# Patient Record
Sex: MALE | Race: WHITE | NOT HISPANIC OR LATINO | Employment: FULL TIME | URBAN - METROPOLITAN AREA
[De-identification: names, ages, dates, MRNs, and addresses within clinical notes are randomized per-mention and may not be internally consistent; named-entity substitution may affect disease eponyms.]

---

## 2017-12-11 ENCOUNTER — HOSPITAL ENCOUNTER (EMERGENCY)
Facility: HOSPITAL | Age: 47
Discharge: HOME | End: 2017-12-11
Attending: EMERGENCY MEDICINE | Admitting: EMERGENCY MEDICINE

## 2017-12-11 ENCOUNTER — APPOINTMENT (OUTPATIENT)
Dept: GENERAL RADIOLOGY | Facility: HOSPITAL | Age: 47
End: 2017-12-11

## 2017-12-11 VITALS
SYSTOLIC BLOOD PRESSURE: 133 MMHG | TEMPERATURE: 97.7 F | HEART RATE: 93 BPM | OXYGEN SATURATION: 97 % | DIASTOLIC BLOOD PRESSURE: 93 MMHG | HEIGHT: 75 IN | BODY MASS INDEX: 29.73 KG/M2 | WEIGHT: 239.13 LBS | RESPIRATION RATE: 22 BRPM

## 2017-12-11 DIAGNOSIS — J45.901 SEVERE ASTHMA WITH EXACERBATION, UNSPECIFIED WHETHER PERSISTENT: Primary | ICD-10-CM

## 2017-12-11 LAB
ALBUMIN SERPL-MCNC: 4.5 G/DL (ref 3.5–5.2)
ALBUMIN/GLOB SERPL: 1.5 G/DL
ALP SERPL-CCNC: 67 U/L (ref 40–129)
ALT SERPL W P-5'-P-CCNC: 52 U/L (ref 5–41)
ANION GAP SERPL CALCULATED.3IONS-SCNC: 16.5 MMOL/L
ARTERIAL PATENCY WRIST A: POSITIVE
AST SERPL-CCNC: 40 U/L (ref 5–40)
ATMOSPHERIC PRESS: 733 MMHG
BASE EXCESS BLDA CALC-SCNC: -2.5 MMOL/L (ref 0–2)
BASOPHILS # BLD AUTO: 0.06 10*3/MM3 (ref 0–0.2)
BASOPHILS NFR BLD AUTO: 0.7 % (ref 0–2)
BDY SITE: ABNORMAL
BILIRUB SERPL-MCNC: 0.3 MG/DL (ref 0.2–1.2)
BODY TEMPERATURE: 37 C
BUN BLD-MCNC: 9 MG/DL (ref 6–20)
BUN/CREAT SERPL: 9.8 (ref 7–25)
CALCIUM SPEC-SCNC: 9.2 MG/DL (ref 8.6–10.5)
CHLORIDE SERPL-SCNC: 102 MMOL/L (ref 98–107)
CO2 SERPL-SCNC: 22.5 MMOL/L (ref 22–29)
CREAT BLD-MCNC: 0.92 MG/DL (ref 0.76–1.27)
DEPRECATED RDW RBC AUTO: 43.9 FL (ref 37–54)
EOSINOPHIL # BLD AUTO: 0.45 10*3/MM3 (ref 0.1–0.3)
EOSINOPHIL NFR BLD AUTO: 5.1 % (ref 0–4)
ERYTHROCYTE [DISTWIDTH] IN BLOOD BY AUTOMATED COUNT: 13.7 % (ref 11.5–14.5)
GAS FLOW AIRWAY: 2 LPM
GFR SERPL CREATININE-BSD FRML MDRD: 89 ML/MIN/1.73
GLOBULIN UR ELPH-MCNC: 3.1 GM/DL
GLUCOSE BLD-MCNC: 98 MG/DL (ref 65–99)
HCO3 BLDA-SCNC: 21.7 MMOL/L (ref 20–26)
HCT VFR BLD AUTO: 41.2 % (ref 42–52)
HGB BLD-MCNC: 14.4 G/DL (ref 14–18)
HGB BLDA-MCNC: 14.2 G/DL (ref 14–18)
IMM GRANULOCYTES # BLD: 0.06 10*3/MM3 (ref 0–0.03)
IMM GRANULOCYTES NFR BLD: 0.7 % (ref 0–0.5)
LYMPHOCYTES # BLD AUTO: 3.8 10*3/MM3 (ref 0.6–4.8)
LYMPHOCYTES NFR BLD AUTO: 43.5 % (ref 20–45)
Lab: ABNORMAL
MCH RBC QN AUTO: 31 PG (ref 27–31)
MCHC RBC AUTO-ENTMCNC: 35 G/DL (ref 31–37)
MCV RBC AUTO: 88.8 FL (ref 80–94)
MODALITY: ABNORMAL
MONOCYTES # BLD AUTO: 0.94 10*3/MM3 (ref 0–1)
MONOCYTES NFR BLD AUTO: 10.8 % (ref 3–8)
NEUTROPHILS # BLD AUTO: 3.43 10*3/MM3 (ref 1.5–8.3)
NEUTROPHILS NFR BLD AUTO: 39.2 % (ref 45–70)
NRBC BLD MANUAL-RTO: 0 /100 WBC (ref 0–0)
NT-PROBNP SERPL-MCNC: 20.5 PG/ML (ref 0–450)
PCO2 BLDA: 35.1 MM HG (ref 35–45)
PCO2 TEMP ADJ BLD: 35.1 MM HG (ref 35–45)
PH BLDA: 7.4 PH UNITS (ref 7.35–7.45)
PH, TEMP CORRECTED: 7.4 PH UNITS (ref 7.35–7.45)
PLATELET # BLD AUTO: 239 10*3/MM3 (ref 140–500)
PMV BLD AUTO: 8.7 FL (ref 7.4–10.4)
PO2 BLDA: 103 MM HG (ref 83–108)
PO2 TEMP ADJ BLD: 103 MM HG (ref 83–108)
POTASSIUM BLD-SCNC: 3.7 MMOL/L (ref 3.5–5.2)
PROT SERPL-MCNC: 7.6 G/DL (ref 6–8.5)
RBC # BLD AUTO: 4.64 10*6/MM3 (ref 4.7–6.1)
SAO2 % BLDCOA: 98.7 % (ref 94–99)
SODIUM BLD-SCNC: 141 MMOL/L (ref 136–145)
TROPONIN T SERPL-MCNC: <0.01 NG/ML (ref 0–0.03)
VENTILATOR MODE: ABNORMAL
WBC NRBC COR # BLD: 8.74 10*3/MM3 (ref 4.8–10.8)

## 2017-12-11 PROCEDURE — 85025 COMPLETE CBC W/AUTO DIFF WBC: CPT | Performed by: EMERGENCY MEDICINE

## 2017-12-11 PROCEDURE — 99284 EMERGENCY DEPT VISIT MOD MDM: CPT

## 2017-12-11 PROCEDURE — 93010 ELECTROCARDIOGRAM REPORT: CPT | Performed by: INTERNAL MEDICINE

## 2017-12-11 PROCEDURE — 36600 WITHDRAWAL OF ARTERIAL BLOOD: CPT

## 2017-12-11 PROCEDURE — 80053 COMPREHEN METABOLIC PANEL: CPT | Performed by: EMERGENCY MEDICINE

## 2017-12-11 PROCEDURE — 71020 HC CHEST PA AND LATERAL: CPT

## 2017-12-11 PROCEDURE — 94799 UNLISTED PULMONARY SVC/PX: CPT

## 2017-12-11 PROCEDURE — 25010000002 METHYLPREDNISOLONE PER 125 MG: Performed by: EMERGENCY MEDICINE

## 2017-12-11 PROCEDURE — 94640 AIRWAY INHALATION TREATMENT: CPT

## 2017-12-11 PROCEDURE — 96374 THER/PROPH/DIAG INJ IV PUSH: CPT

## 2017-12-11 PROCEDURE — 99285 EMERGENCY DEPT VISIT HI MDM: CPT | Performed by: EMERGENCY MEDICINE

## 2017-12-11 PROCEDURE — 82803 BLOOD GASES ANY COMBINATION: CPT

## 2017-12-11 PROCEDURE — 93005 ELECTROCARDIOGRAM TRACING: CPT | Performed by: EMERGENCY MEDICINE

## 2017-12-11 PROCEDURE — 84484 ASSAY OF TROPONIN QUANT: CPT | Performed by: EMERGENCY MEDICINE

## 2017-12-11 PROCEDURE — 83880 ASSAY OF NATRIURETIC PEPTIDE: CPT | Performed by: EMERGENCY MEDICINE

## 2017-12-11 RX ORDER — METHYLPREDNISOLONE SODIUM SUCCINATE 125 MG/2ML
125 INJECTION, POWDER, LYOPHILIZED, FOR SOLUTION INTRAMUSCULAR; INTRAVENOUS ONCE
Status: COMPLETED | OUTPATIENT
Start: 2017-12-11 | End: 2017-12-11

## 2017-12-11 RX ORDER — PREDNISONE 20 MG/1
60 TABLET ORAL DAILY
Qty: 15 TABLET | Refills: 0 | Status: SHIPPED | OUTPATIENT
Start: 2017-12-11 | End: 2017-12-16

## 2017-12-11 RX ORDER — IPRATROPIUM BROMIDE AND ALBUTEROL SULFATE 2.5; .5 MG/3ML; MG/3ML
3 SOLUTION RESPIRATORY (INHALATION) ONCE
Status: COMPLETED | OUTPATIENT
Start: 2017-12-11 | End: 2017-12-11

## 2017-12-11 RX ORDER — AZITHROMYCIN 250 MG/1
TABLET, FILM COATED ORAL
Qty: 6 TABLET | Refills: 0 | Status: SHIPPED | OUTPATIENT
Start: 2017-12-11

## 2017-12-11 RX ORDER — ALBUTEROL SULFATE 2.5 MG/3ML
2.5 SOLUTION RESPIRATORY (INHALATION) ONCE
Status: COMPLETED | OUTPATIENT
Start: 2017-12-11 | End: 2017-12-11

## 2017-12-11 RX ADMIN — IPRATROPIUM BROMIDE AND ALBUTEROL SULFATE 3 ML: .5; 3 SOLUTION RESPIRATORY (INHALATION) at 20:26

## 2017-12-11 RX ADMIN — METHYLPREDNISOLONE SODIUM SUCCINATE 125 MG: 125 INJECTION, POWDER, FOR SOLUTION INTRAMUSCULAR; INTRAVENOUS at 20:30

## 2017-12-11 RX ADMIN — ALBUTEROL SULFATE 2.5 MG: 2.5 SOLUTION RESPIRATORY (INHALATION) at 20:32

## 2017-12-12 NOTE — ED PROVIDER NOTES
Subjective   History of Present Illness  History of Present Illness    Chief complaint: Dyspnea    Location: chest    Quality/Severity:  Moderate-to-severe    Timing/Duration: Began about one hour ago    Modifying Factors: None    Narrative: This patient presents for evaluation of new onset difficulties breathing.  He has a history of asthma.  He says that he felt fine through most of the day.  Then sometime this evening shortly after eating dinner he had a lot of shortness of breath with wheezing and coughing.  He tried his home nebulizer and it a breathing treatment but that did not seem to help very much.  Now he is feeling more anxious.  He is having a nonproductive cough.  He denies any fevers.  He has had some aching in his chest but he thinks it's from his breathing symptoms tonight.  He does not have any history of heart disease.  He does not wear oxygen at home.  He does not smoke.    Associated Symptoms: As above    Review of Systems   Constitutional: Negative for activity change, chills and fever.   Respiratory: Positive for cough, chest tightness, shortness of breath and wheezing.    Cardiovascular: Positive for chest pain (aching all across). Negative for palpitations and leg swelling.   Gastrointestinal: Negative for abdominal distention and vomiting.   Skin: Negative for color change and rash.   Neurological: Negative for seizures and syncope.   All other systems reviewed and are negative.      Past Medical History:   Diagnosis Date   • Asthma    • Depression    • Disease of thyroid gland     hypothyroidism       Allergies   Allergen Reactions   • No Known Drug Allergy    • Other        Past Surgical History:   Procedure Laterality Date   • KNEE SURGERY     • MOUTH SURGERY         Family History   Problem Relation Age of Onset   • Arthritis Mother    • Diabetes Mother    • Arthritis Father    • Heart disease Father    • Diabetes Father    • Emphysema Father    • Emphysema Paternal Grandfather         Social History     Social History   • Marital status:      Spouse name: N/A   • Number of children: N/A   • Years of education: N/A     Social History Main Topics   • Smoking status: Never Smoker   • Smokeless tobacco: None   • Alcohol use 1.2 oz/week     2 Cans of beer per week   • Drug use: None   • Sexual activity: Not Asked     Other Topics Concern   • None     Social History Narrative   • None       ED Triage Vitals   Temp Heart Rate Resp BP SpO2   12/11/17 2008 12/11/17 2005 12/11/17 2005 12/11/17 2006 12/11/17 2005   97.7 °F (36.5 °C) 108 24 158/103 94 %      Temp src Heart Rate Source Patient Position BP Location FiO2 (%)   -- -- -- -- --                Objective   Physical Exam   Constitutional: He is oriented to person, place, and time. He appears well-developed and well-nourished. He appears distressed (mild).   HENT:   Head: Normocephalic and atraumatic.   Eyes: EOM are normal. Pupils are equal, round, and reactive to light. Right eye exhibits no discharge. Left eye exhibits no discharge.   Neck: Normal range of motion. Neck supple. No tracheal deviation present.   Cardiovascular: Normal rate, regular rhythm, normal heart sounds and intact distal pulses.    Slightly tachycardic around 105 bpm   Pulmonary/Chest: He is in respiratory distress. He has wheezes. He has no rales. He exhibits no tenderness.   Tight sounding chest with diminished breath sounds bilaterally and some diffuse wheezes bilaterally as well.  There is mild to moderate accessory muscle use noted and some mild tachypnea with a respiratory rate around 24/ min   Abdominal: Soft. He exhibits no mass. There is no tenderness.   Musculoskeletal: Normal range of motion. He exhibits no edema or deformity.   Neurological: He is alert and oriented to person, place, and time. He exhibits normal muscle tone.   Skin: Skin is warm. No rash noted. He is diaphoretic (slightly along the forehead). No erythema. No pallor.   Psychiatric: He has  a normal mood and affect. His behavior is normal. Judgment and thought content normal.   Nursing note and vitals reviewed.      EKG           EKG time/Interp time: 2012/2012  Rhythm/Rate: Sinus tachycardia, 106 bpm  P waves and NJ: P waves are present, 123 ms  QRS, axis: 82 ms, normal axis   ST and T waves: No acute ischemic changes evident     Independently interpreted by me contemporaneously with treatment    Results for orders placed or performed during the hospital encounter of 12/11/17   Comprehensive Metabolic Panel   Result Value Ref Range    Glucose 98 65 - 99 mg/dL    BUN 9 6 - 20 mg/dL    Creatinine 0.92 0.76 - 1.27 mg/dL    Sodium 141 136 - 145 mmol/L    Potassium 3.7 3.5 - 5.2 mmol/L    Chloride 102 98 - 107 mmol/L    CO2 22.5 22.0 - 29.0 mmol/L    Calcium 9.2 8.6 - 10.5 mg/dL    Total Protein 7.6 6.0 - 8.5 g/dL    Albumin 4.50 3.50 - 5.20 g/dL    ALT (SGPT) 52 (H) 5 - 41 U/L    AST (SGOT) 40 5 - 40 U/L    Alkaline Phosphatase 67 40 - 129 U/L    Total Bilirubin 0.3 0.2 - 1.2 mg/dL    eGFR Non African Amer 89 >60 mL/min/1.73    Globulin 3.1 gm/dL    A/G Ratio 1.5 g/dL    BUN/Creatinine Ratio 9.8 7.0 - 25.0    Anion Gap 16.5 mmol/L   BNP   Result Value Ref Range    proBNP 20.5 0.0 - 450.0 pg/mL   Troponin   Result Value Ref Range    Troponin T <0.010 0.000 - 0.030 ng/mL   CBC Auto Differential   Result Value Ref Range    WBC 8.74 4.80 - 10.80 10*3/mm3    RBC 4.64 (L) 4.70 - 6.10 10*6/mm3    Hemoglobin 14.4 14.0 - 18.0 g/dL    Hematocrit 41.2 (L) 42.0 - 52.0 %    MCV 88.8 80.0 - 94.0 fL    MCH 31.0 27.0 - 31.0 pg    MCHC 35.0 31.0 - 37.0 g/dL    RDW 13.7 11.5 - 14.5 %    RDW-SD 43.9 37.0 - 54.0 fl    MPV 8.7 7.4 - 10.4 fL    Platelets 239 140 - 500 10*3/mm3    Neutrophil % 39.2 (L) 45.0 - 70.0 %    Lymphocyte % 43.5 20.0 - 45.0 %    Monocyte % 10.8 (H) 3.0 - 8.0 %    Eosinophil % 5.1 (H) 0.0 - 4.0 %    Basophil % 0.7 0.0 - 2.0 %    Immature Grans % 0.7 (H) 0.0 - 0.5 %    Neutrophils, Absolute 3.43 1.50 -  8.30 10*3/mm3    Lymphocytes, Absolute 3.80 0.60 - 4.80 10*3/mm3    Monocytes, Absolute 0.94 0.00 - 1.00 10*3/mm3    Eosinophils, Absolute 0.45 (H) 0.10 - 0.30 10*3/mm3    Basophils, Absolute 0.06 0.00 - 0.20 10*3/mm3    Immature Grans, Absolute 0.06 (H) 0.00 - 0.03 10*3/mm3    nRBC 0.0 0.0 - 0.0 /100 WBC   Blood Gas, Arterial   Result Value Ref Range    Site Left Radial     Anthony's Test Positive     pH, Arterial 7.400 7.350 - 7.450 pH units    pCO2, Arterial 35.1 35.0 - 45.0 mm Hg    pO2, Arterial 103.0 83.0 - 108.0 mm Hg    HCO3, Arterial 21.7 20.0 - 26.0 mmol/L    Base Excess, Arterial -2.5 (L) 0.0 - 2.0 mmol/L    O2 Saturation, Arterial 98.7 94.0 - 99.0 %    Hemoglobin, Blood Gas 14.2 14 - 18 g/dL    Temperature 37.0 C    pH, Temp Corrected 7.400 7.350 - 7.450 pH Units    pCO2, Temperature Corrected 35.1 35 - 45 mm Hg    pO2, Temperature Corrected 103 83 - 108 mm Hg    Barometric Pressure for Blood Gas 733 mmHg    Modality Nasal Cannula     Flow Rate 2.0 lpm    Ventilator Mode NA     Collected by [id number]          RADIOLOGY        Study: Chest x-ray    Findings: NO acute disease    Interpreted contemporaneously with treatment by myself, independently viewed by me            Procedures         ED Course  ED Course   Comment By Time   I have reviewed the labs and the chest x-ray.  Everything appears surprisingly quite reassuring.  The patient arrived with some obvious and worrisome signs of an asthma attack.  He was very tight in his chest and had some increased work of breathing.  Fortunately he turned around pretty quickly with some IV steroids and a couple of breathing treatments.  Now, on reexamination, his work of breathing is completely normal.  He has clear breath sounds bilaterally.  There are only slightly diminished on the right side comparison to the left.  His oxygenation is normal on room air.  He is requesting to go home so he can rest.  I don't think we need to keep him any longer for observation at  "this point.  I'll discharge him home with a course of steroids and a Z-Chucho.  He has a home nebulizer that he can use.  I also gave him the phone number for Dr. Maxwell's office to refer for outpatient pulmonary consultation.  Finally, I reviewed with him all of the usual \"return to ER\" instructions for any worsening signs of dyspnea or asthma symptoms. Korey Lewis MD 12/11 2217                  MDM  Number of Diagnoses or Management Options     Amount and/or Complexity of Data Reviewed  Clinical lab tests: reviewed and ordered  Tests in the radiology section of CPT®: ordered and reviewed  Decide to obtain previous medical records or to obtain history from someone other than the patient: yes  Review and summarize past medical records: yes  Independent visualization of images, tracings, or specimens: yes    Risk of Complications, Morbidity, and/or Mortality  Presenting problems: high  Diagnostic procedures: moderate  Management options: moderate        Final diagnoses:   Severe asthma with exacerbation, unspecified whether persistent            Korey Lewis MD  12/11/17 [id number]    "

## 2017-12-12 NOTE — DISCHARGE INSTRUCTIONS
Please return to the emergency room for any worsening pain, cough, fevers, weakness, dizziness, difficulties breathing or any other concerns.  Continue using your home inhaler and nebulizer as we discussed today.

## 2018-01-03 ENCOUNTER — TRANSCRIBE ORDERS (OUTPATIENT)
Dept: ADMINISTRATIVE | Facility: HOSPITAL | Age: 48
End: 2018-01-03

## 2018-01-03 ENCOUNTER — LAB (OUTPATIENT)
Dept: LAB | Facility: HOSPITAL | Age: 48
End: 2018-01-03
Attending: INTERNAL MEDICINE

## 2018-01-03 ENCOUNTER — HOSPITAL ENCOUNTER (OUTPATIENT)
Dept: PULMONOLOGY | Facility: HOSPITAL | Age: 48
Discharge: HOME | End: 2018-01-03
Attending: INTERNAL MEDICINE | Admitting: INTERNAL MEDICINE

## 2018-01-03 DIAGNOSIS — R06.00 DYSPNEA, UNSPECIFIED TYPE: ICD-10-CM

## 2018-01-03 DIAGNOSIS — R06.00 DYSPNEA, UNSPECIFIED TYPE: Primary | ICD-10-CM

## 2018-01-03 LAB
DEPRECATED RDW RBC AUTO: 39.6 FL (ref 37–54)
EOSINOPHIL # BLD MANUAL: 0.31 10*3/MM3 (ref 0.1–0.3)
EOSINOPHIL NFR BLD MANUAL: 5 % (ref 0–4)
ERYTHROCYTE [DISTWIDTH] IN BLOOD BY AUTOMATED COUNT: 12.8 % (ref 11.5–14.5)
HCT VFR BLD AUTO: 40.4 % (ref 42–52)
HGB BLD-MCNC: 14.4 G/DL (ref 14–18)
LYMPHOCYTES # BLD MANUAL: 1.61 10*3/MM3 (ref 0.6–4.8)
LYMPHOCYTES NFR BLD MANUAL: 11 % (ref 3–8)
LYMPHOCYTES NFR BLD MANUAL: 26 % (ref 20–45)
MCH RBC QN AUTO: 30.9 PG (ref 27–31)
MCHC RBC AUTO-ENTMCNC: 35.6 G/DL (ref 31–37)
MCV RBC AUTO: 86.7 FL (ref 80–94)
MONOCYTES # BLD AUTO: 0.68 10*3/MM3 (ref 0–1)
NEUTROPHILS # BLD AUTO: 3.6 10*3/MM3 (ref 1.5–8.3)
NEUTROPHILS NFR BLD MANUAL: 58 % (ref 45–70)
PLATELET # BLD AUTO: 288 10*3/MM3 (ref 140–500)
PMV BLD AUTO: 8.4 FL (ref 7.4–10.4)
RBC # BLD AUTO: 4.66 10*6/MM3 (ref 4.7–6.1)
RBC MORPH BLD: NORMAL
SMALL PLATELETS BLD QL SMEAR: ADEQUATE
WBC MORPH BLD: NORMAL
WBC NRBC COR # BLD: 6.2 10*3/MM3 (ref 4.8–10.8)

## 2018-01-03 PROCEDURE — 94729 DIFFUSING CAPACITY: CPT

## 2018-01-03 PROCEDURE — 94726 PLETHYSMOGRAPHY LUNG VOLUMES: CPT

## 2018-01-03 PROCEDURE — 36415 COLL VENOUS BLD VENIPUNCTURE: CPT

## 2018-01-03 PROCEDURE — 85007 BL SMEAR W/DIFF WBC COUNT: CPT

## 2018-01-03 PROCEDURE — 94060 EVALUATION OF WHEEZING: CPT

## 2018-01-03 PROCEDURE — 85027 COMPLETE CBC AUTOMATED: CPT

## 2018-01-03 PROCEDURE — 82785 ASSAY OF IGE: CPT

## 2018-01-03 RX ORDER — ALBUTEROL SULFATE 90 UG/1
2 AEROSOL, METERED RESPIRATORY (INHALATION) EVERY 4 HOURS PRN
COMMUNITY

## 2018-01-03 RX ORDER — ALBUTEROL SULFATE 2.5 MG/3ML
2.5 SOLUTION RESPIRATORY (INHALATION) ONCE
Status: COMPLETED | OUTPATIENT
Start: 2018-01-03 | End: 2018-01-03

## 2018-01-03 RX ADMIN — ALBUTEROL SULFATE 2.5 MG: 2.5 SOLUTION RESPIRATORY (INHALATION) at 16:57

## 2018-01-04 LAB — TOTAL IGE SMQN RAST: 275 IU/ML (ref 0–100)

## 2020-08-19 ENCOUNTER — TRANSCRIBE ORDERS (OUTPATIENT)
Dept: ADMINISTRATIVE | Facility: HOSPITAL | Age: 50
End: 2020-08-19

## 2020-08-19 DIAGNOSIS — J98.4 RESTRICTIVE LUNG DISEASE: Primary | ICD-10-CM

## 2020-09-04 ENCOUNTER — HOSPITAL ENCOUNTER (OUTPATIENT)
Dept: CT IMAGING | Facility: HOSPITAL | Age: 50
Discharge: HOME | End: 2020-09-04
Admitting: INTERNAL MEDICINE

## 2020-09-04 DIAGNOSIS — J98.4 RESTRICTIVE LUNG DISEASE: ICD-10-CM

## 2020-09-04 PROCEDURE — 71250 CT THORAX DX C-: CPT

## 2020-09-09 ENCOUNTER — TRANSCRIBE ORDERS (OUTPATIENT)
Dept: ADMINISTRATIVE | Facility: HOSPITAL | Age: 50
End: 2020-09-09

## 2020-09-09 DIAGNOSIS — R91.1 RIGHT MIDDLE LOBE PULMONARY NODULE: Primary | ICD-10-CM

## 2020-09-15 ENCOUNTER — HOSPITAL ENCOUNTER (OUTPATIENT)
Dept: PET IMAGING | Facility: HOSPITAL | Age: 50
End: 2020-09-15

## 2020-09-15 ENCOUNTER — APPOINTMENT (OUTPATIENT)
Dept: PET IMAGING | Facility: HOSPITAL | Age: 50
End: 2020-09-15

## 2020-09-18 ENCOUNTER — APPOINTMENT (OUTPATIENT)
Dept: PET IMAGING | Facility: HOSPITAL | Age: 50
End: 2020-09-18

## 2020-09-23 ENCOUNTER — APPOINTMENT (OUTPATIENT)
Dept: PET IMAGING | Facility: HOSPITAL | Age: 50
End: 2020-09-23

## 2020-10-02 ENCOUNTER — APPOINTMENT (OUTPATIENT)
Dept: PET IMAGING | Facility: HOSPITAL | Age: 50
End: 2020-10-02

## 2020-10-09 ENCOUNTER — HOSPITAL ENCOUNTER (OUTPATIENT)
Dept: PET IMAGING | Facility: HOSPITAL | Age: 50
Discharge: HOME | End: 2020-10-09

## 2020-10-09 DIAGNOSIS — R91.1 RIGHT MIDDLE LOBE PULMONARY NODULE: ICD-10-CM

## 2020-10-09 LAB — GLUCOSE BLDC GLUCOMTR-MCNC: 101 MG/DL (ref 70–130)

## 2020-10-09 PROCEDURE — 0 FLUDEOXYGLUCOSE F18 SOLUTION: Performed by: INTERNAL MEDICINE

## 2020-10-09 PROCEDURE — A9552 F18 FDG: HCPCS | Performed by: INTERNAL MEDICINE

## 2020-10-09 PROCEDURE — 82962 GLUCOSE BLOOD TEST: CPT

## 2020-10-09 PROCEDURE — 78815 PET IMAGE W/CT SKULL-THIGH: CPT

## 2020-10-09 RX ADMIN — FLUDEOXYGLUCOSE F18 1 DOSE: 300 INJECTION INTRAVENOUS at 11:39

## 2020-10-19 ENCOUNTER — TRANSCRIBE ORDERS (OUTPATIENT)
Dept: ADMINISTRATIVE | Facility: HOSPITAL | Age: 50
End: 2020-10-19

## 2020-10-21 ENCOUNTER — TRANSCRIBE ORDERS (OUTPATIENT)
Dept: ADMINISTRATIVE | Facility: HOSPITAL | Age: 50
End: 2020-10-21

## 2020-10-22 ENCOUNTER — TRANSCRIBE ORDERS (OUTPATIENT)
Dept: ADMINISTRATIVE | Facility: HOSPITAL | Age: 50
End: 2020-10-22

## 2020-10-22 DIAGNOSIS — R91.1 LUNG NODULE: Primary | ICD-10-CM

## 2020-10-22 DIAGNOSIS — R59.0 LOCALIZED ENLARGED LYMPH NODES: Primary | ICD-10-CM

## 2020-11-05 ENCOUNTER — HOSPITAL ENCOUNTER (OUTPATIENT)
Dept: CT IMAGING | Facility: HOSPITAL | Age: 50
Discharge: HOME | End: 2020-11-05
Admitting: INTERNAL MEDICINE

## 2020-11-05 ENCOUNTER — APPOINTMENT (OUTPATIENT)
Dept: CT IMAGING | Facility: HOSPITAL | Age: 50
End: 2020-11-05

## 2020-11-05 DIAGNOSIS — R59.0 LOCALIZED ENLARGED LYMPH NODES: ICD-10-CM

## 2020-11-05 PROCEDURE — 82565 ASSAY OF CREATININE: CPT

## 2020-11-05 PROCEDURE — 70491 CT SOFT TISSUE NECK W/DYE: CPT

## 2020-11-05 PROCEDURE — 25010000002 IOPAMIDOL 61 % SOLUTION: Performed by: INTERNAL MEDICINE

## 2020-11-05 RX ADMIN — IOPAMIDOL 75 ML: 612 INJECTION, SOLUTION INTRAVENOUS at 11:46

## 2020-11-06 LAB — CREAT BLDA-MCNC: 0.8 MG/DL (ref 0.6–1.3)

## 2021-01-15 ENCOUNTER — APPOINTMENT (OUTPATIENT)
Dept: PET IMAGING | Facility: HOSPITAL | Age: 51
End: 2021-01-15

## 2021-11-03 ENCOUNTER — TRANSCRIBE ORDERS (OUTPATIENT)
Dept: ADMINISTRATIVE | Facility: HOSPITAL | Age: 51
End: 2021-11-03

## 2021-11-03 DIAGNOSIS — R91.1 LUNG NODULE: Primary | ICD-10-CM

## 2022-01-25 ENCOUNTER — HOSPITAL ENCOUNTER (OUTPATIENT)
Dept: CT IMAGING | Facility: HOSPITAL | Age: 52
Discharge: HOME | End: 2022-01-25
Admitting: INTERNAL MEDICINE

## 2022-01-25 DIAGNOSIS — R91.1 LUNG NODULE: ICD-10-CM

## 2022-01-25 PROCEDURE — 71250 CT THORAX DX C-: CPT

## 2022-01-27 ENCOUNTER — TRANSCRIBE ORDERS (OUTPATIENT)
Dept: ADMINISTRATIVE | Facility: HOSPITAL | Age: 52
End: 2022-01-27

## 2022-01-27 DIAGNOSIS — R91.1 LUNG NODULE: Primary | ICD-10-CM

## 2023-05-11 ENCOUNTER — TRANSCRIBE ORDERS (OUTPATIENT)
Dept: ADMINISTRATIVE | Facility: HOSPITAL | Age: 53
End: 2023-05-11
Payer: COMMERCIAL

## 2023-05-11 DIAGNOSIS — R91.1 LUNG NODULE: ICD-10-CM

## 2023-07-31 ENCOUNTER — HOSPITAL ENCOUNTER (OUTPATIENT)
Dept: CT IMAGING | Facility: HOSPITAL | Age: 53
Discharge: HOME | End: 2023-07-31
Admitting: INTERNAL MEDICINE
Payer: COMMERCIAL

## 2023-07-31 DIAGNOSIS — R91.1 LUNG NODULE: ICD-10-CM

## 2023-07-31 PROCEDURE — 71250 CT THORAX DX C-: CPT

## 2023-08-15 ENCOUNTER — TRANSCRIBE ORDERS (OUTPATIENT)
Dept: ADMINISTRATIVE | Facility: HOSPITAL | Age: 53
End: 2023-08-15
Payer: COMMERCIAL

## 2023-08-15 DIAGNOSIS — R91.8 LUNG NODULES: Primary | ICD-10-CM

## 2024-08-28 ENCOUNTER — TRANSCRIBE ORDERS (OUTPATIENT)
Dept: ADMINISTRATIVE | Facility: HOSPITAL | Age: 54
End: 2024-08-28
Payer: COMMERCIAL

## 2024-08-28 DIAGNOSIS — R91.8 LUNG NODULES: Primary | ICD-10-CM

## 2024-09-27 ENCOUNTER — HOSPITAL ENCOUNTER (OUTPATIENT)
Dept: CT IMAGING | Facility: HOSPITAL | Age: 54
Discharge: HOME | End: 2024-09-27
Admitting: INTERNAL MEDICINE
Payer: COMMERCIAL

## 2024-09-27 DIAGNOSIS — R91.8 LUNG NODULES: ICD-10-CM

## 2024-09-27 PROCEDURE — 71250 CT THORAX DX C-: CPT
